# Patient Record
Sex: MALE | Race: WHITE | Employment: OTHER | URBAN - METROPOLITAN AREA
[De-identification: names, ages, dates, MRNs, and addresses within clinical notes are randomized per-mention and may not be internally consistent; named-entity substitution may affect disease eponyms.]

---

## 2023-02-02 ENCOUNTER — TELEPHONE (OUTPATIENT)
Dept: CARDIOLOGY | Age: 68
End: 2023-02-02

## 2023-02-06 ENCOUNTER — HOSPITAL ENCOUNTER (OUTPATIENT)
Dept: CARDIOLOGY | Age: 68
Discharge: HOME | End: 2023-02-06
Payer: MEDICARE

## 2023-02-06 VITALS
WEIGHT: 260 LBS | HEART RATE: 65 BPM | RESPIRATION RATE: 16 BRPM | HEIGHT: 72 IN | SYSTOLIC BLOOD PRESSURE: 142 MMHG | DIASTOLIC BLOOD PRESSURE: 76 MMHG | BODY MASS INDEX: 35.21 KG/M2

## 2023-02-06 DIAGNOSIS — R94.31 ABNORMAL EKG: Primary | ICD-10-CM

## 2023-02-06 PROCEDURE — 78452 HT MUSCLE IMAGE SPECT MULT: CPT

## 2023-02-06 PROCEDURE — 2580000003 HC RX 258: Performed by: INTERNAL MEDICINE

## 2023-02-06 PROCEDURE — 6360000002 HC RX W HCPCS: Performed by: NURSE PRACTITIONER

## 2023-02-06 PROCEDURE — A9500 TC99M SESTAMIBI: HCPCS | Performed by: INTERNAL MEDICINE

## 2023-02-06 PROCEDURE — 93017 CV STRESS TEST TRACING ONLY: CPT

## 2023-02-06 PROCEDURE — 3430000000 HC RX DIAGNOSTIC RADIOPHARMACEUTICAL: Performed by: INTERNAL MEDICINE

## 2023-02-06 RX ORDER — TECHNETIUM TC-99M SESTAMIBI 1 MG/10ML
10.2 INJECTION INTRAVENOUS
Status: COMPLETED | OUTPATIENT
Start: 2023-02-06 | End: 2023-02-06

## 2023-02-06 RX ORDER — TECHNETIUM TC-99M SESTAMIBI 1 MG/10ML
30.8 INJECTION INTRAVENOUS
Status: COMPLETED | OUTPATIENT
Start: 2023-02-06 | End: 2023-02-06

## 2023-02-06 RX ORDER — SODIUM CHLORIDE 0.9 % (FLUSH) 0.9 %
10 SYRINGE (ML) INJECTION PRN
Status: DISCONTINUED | OUTPATIENT
Start: 2023-02-06 | End: 2023-02-07 | Stop reason: HOSPADM

## 2023-02-06 RX ADMIN — Medication 10.2 MILLICURIE: at 09:07

## 2023-02-06 RX ADMIN — Medication 30.8 MILLICURIE: at 10:17

## 2023-02-06 RX ADMIN — SODIUM CHLORIDE, PRESERVATIVE FREE 10 ML: 5 INJECTION INTRAVENOUS at 09:07

## 2023-02-06 RX ADMIN — SODIUM CHLORIDE, PRESERVATIVE FREE 10 ML: 5 INJECTION INTRAVENOUS at 10:18

## 2023-02-06 RX ADMIN — SODIUM CHLORIDE, PRESERVATIVE FREE 10 ML: 5 INJECTION INTRAVENOUS at 10:17

## 2023-02-06 RX ADMIN — REGADENOSON 0.4 MG: 0.08 INJECTION, SOLUTION INTRAVENOUS at 10:17

## 2023-02-06 NOTE — PROCEDURES
[id number] [facility], [facility] - [address], [address] [place] [phone]                Pharmacologic Stress Nuclear Gated SPECT Study    Name: [facility] Account Number: [de-identified]    :  1955          Sex: male         Date of Study:  2023    Height: 6' (182.9 cm)         Weight: 260 lb (117.9 kg)     Ordering Provider: EMMY Frost          PCP: CLARE Frost CNP      Cardiologist: none             Interpreting Physician: Liss Oquendo MD  _________________________________________________________________________________    Indication:   Detecting the presence and location of coronary artery disease    Clinical History:   Patient has no known history of coronary artery disease. Procedure:   Pharmacologic stress testing was performed with regadenoson 0.4 mg for 15 seconds. Additionally, low-level exercise was performed along with the infusion. The heart rate was 65 at baseline and jadiel to 109 beats during the infusion. This corresponds to 72% of maximum predicted heart rate. The blood pressure at baseline was 142/76 and blood pressure at the end of infusion was 146/80. Blood pressure response was normal during the stress procedure. The patient experienced mild shortness of breathe and dizziness during the infusion. ECG during the infusion did not change. IMAGING: Myocardial perfusion imaging was performed at rest 30-35 minutes following the intravenous injection of 10.2 mCi of (Tc-Sestamibi) followed by 10 ml of Normal Saline. As per infusion protocol, the patient was injected intravenously with 30.8 mCi of (Tc-Sestamibi) followed by 10 ml of Normal Saline. Gated post-stress tomographic imaging was performed 20-25 minutes after stress. FINDINGS: The overall quality of the study was good.      Left ventricular cavity size was noted to be normal.    Rotational analog analysis Discussed monovision. This helps lessen the dependence on glasses, but is a compromise and glasses are often still needed for some activities including driving and binocular vision. demonstrated no patient motion or abnormal extracardiac radioactivity. A mild defect was present in the basal inferolateral and basal inferior wall(s) that was  small sized by quantification. The resting images show no change. Gated SPECT left ventricular ejection fraction was calculated to be 51%, with hypokinesis of the inferior wall. Impression:    ECG during the infusion did not change. The myocardial perfusion imaging was abnormal.  Although the perfusion defect conceivably could be artifactual, it is coupled with a wall motion abnormality in same territory of ventricle  Overall left ventricular systolic function was abnormal with regional wall motion abnormalities. Intermediate risk general pharmacologic nuclear stress test.    Thank you for sending your patient to this [employer].      Electronically signed by Abraham Rivera MD on 2/6/23 at 12:24 PM EST

## 2023-02-06 NOTE — DISCHARGE INSTRUCTIONS
[address] [address]      Instructions to Patients    The following are the instructions for patients who have had a procedure in our office today. Patient name: Zachery Leventhal Sr.    Radionuclide Activity: 40mCi of 99mTc-Sestamibi    Date Administered: 2/6/2023    Expires: 48 hours after scheduled appointment time      Patient may resume normal activity unless otherwise instructed. Patient may resume medications as normal.  If the need should arise, patient may call [phone] between the hours of 8:00am-5:00pm.  After hours there is at least one physician on-call at all times for those patients needing assistance. Patients may call [phone] and the answering service will direct the patient to one of our physicians for assistance. After the patient's test if they are going to be leaving from an airport in the near future they should take this letter with them to verify the test and radionuclide used for their test.      This letter verifies that the above named bearer received an injection of a radionuclide for medical purpose/usage only.         Electronically signed by Mauricio Camarillo on 2/6/2023 at 10:17 AM

## 2023-02-07 ENCOUNTER — TELEPHONE (OUTPATIENT)
Dept: ADMINISTRATIVE | Age: 68
End: 2023-02-07

## 2023-02-07 NOTE — TELEPHONE ENCOUNTER
URGENT NP scheduled from the [facility]. Patient Appointment Form:      PCP: Dr. Jason Walton  Referring: Dr. Jason Walton    Has the Patient:    Seen a Cardiologist? no    Had a heart catheterization? no    Had heart surgery? no    Had a stress test or nuclear stress test? Yes 2/6/23 [facility] [place]    Had an echocardiogram? no    Had a vascular ultrasound? no    Had a 24/48 heart monitor or extended cardiac event monitor? no    Had recent blood work in the last 6 months? Had a pacemaker/ICD/ILR implant? no    Seen an Electrophysiologist? no        Will send records via: Recent + stress only - recs - PCP recs in Media      Date & time of appointment:  2/10/23 @ 11:45 with Dr Aaron Garcia - if pt is willing to wait for [facility] - they will callback and r/s after speaking with Jason Walton?

## 2023-02-08 PROBLEM — R94.39 ABNORMAL NUCLEAR STRESS TEST: Status: ACTIVE | Noted: 2023-02-08

## 2023-02-10 ENCOUNTER — OFFICE VISIT (OUTPATIENT)
Dept: CARDIOLOGY CLINIC | Age: 68
End: 2023-02-10
Payer: MEDICARE

## 2023-02-10 VITALS
SYSTOLIC BLOOD PRESSURE: 175 MMHG | DIASTOLIC BLOOD PRESSURE: 81 MMHG | RESPIRATION RATE: 16 BRPM | HEIGHT: 72 IN | BODY MASS INDEX: 35.54 KG/M2 | HEART RATE: 64 BPM | WEIGHT: 262.4 LBS

## 2023-02-10 DIAGNOSIS — F17.210 CIGARETTE NICOTINE DEPENDENCE WITHOUT COMPLICATION: ICD-10-CM

## 2023-02-10 DIAGNOSIS — R94.39 ABNORMAL NUCLEAR STRESS TEST: Primary | ICD-10-CM

## 2023-02-10 DIAGNOSIS — E66.09 CLASS 2 OBESITY DUE TO EXCESS CALORIES WITHOUT SERIOUS COMORBIDITY WITH BODY MASS INDEX (BMI) OF 35.0 TO 35.9 IN ADULT: ICD-10-CM

## 2023-02-10 PROBLEM — E66.9 OBESITY: Status: ACTIVE | Noted: 2023-02-10

## 2023-02-10 PROCEDURE — G8427 DOCREV CUR MEDS BY ELIG CLIN: HCPCS | Performed by: INTERNAL MEDICINE

## 2023-02-10 PROCEDURE — G8417 CALC BMI ABV UP PARAM F/U: HCPCS | Performed by: INTERNAL MEDICINE

## 2023-02-10 PROCEDURE — 99205 OFFICE O/P NEW HI 60 MIN: CPT | Performed by: INTERNAL MEDICINE

## 2023-02-10 PROCEDURE — 1123F ACP DISCUSS/DSCN MKR DOCD: CPT | Performed by: INTERNAL MEDICINE

## 2023-02-10 PROCEDURE — 4004F PT TOBACCO SCREEN RCVD TLK: CPT | Performed by: INTERNAL MEDICINE

## 2023-02-10 PROCEDURE — G8484 FLU IMMUNIZE NO ADMIN: HCPCS | Performed by: INTERNAL MEDICINE

## 2023-02-10 PROCEDURE — 3017F COLORECTAL CA SCREEN DOC REV: CPT | Performed by: INTERNAL MEDICINE

## 2023-02-10 PROCEDURE — 93000 ELECTROCARDIOGRAM COMPLETE: CPT | Performed by: INTERNAL MEDICINE

## 2023-02-10 RX ORDER — ERGOCALCIFEROL 1.25 MG/1
CAPSULE ORAL
COMMUNITY
Start: 2023-01-18

## 2023-02-10 NOTE — PROGRESS NOTES
Chief Complaint   Patient presents with    Abnormal Test Results       Patient Active Problem List    Diagnosis Date Noted    Obesity 02/10/2023    Cigarette nicotine dependence 02/10/2023    Abnormal nuclear stress test 02/08/2023     Overview Note:     Rupert Doan 2/6/2023: The myocardial perfusion imaging was abnormal.  Although the perfusion defect conceivably could be artifactual, it is coupled with a wall motion abnormality in same territory of ventricle  Overall left ventricular systolic function was abnormal with regional wall motion abnormalities. Intermediate risk general pharmacologic nuclear stress test.            Current Outpatient Medications   Medication Sig Dispense Refill    vitamin D (ERGOCALCIFEROL) 1.25 MG (04371 UT) CAPS capsule TAKE 1 CAPSULE BY MOUTH ONCE A WEEK      metaxalone (SKELAXIN) 800 MG tablet Take 800 mg by mouth 3 times daily. clonazePAM (KLONOPIN) 1 MG tablet Take 1 mg by mouth 3 times daily as needed. No current facility-administered medications for this visit. No Known Allergies    Vitals:    02/10/23 1132   BP: (!) 175/81   Pulse: 64   Resp: 16   Weight: 262 lb 6.4 oz (119 kg)   Height: 6' (1.829 m)                 SUBJECTIVE: Margaux Bennett Sr. presents to the office today for consult - Roberto Carlos Araiza. Has a hx of unknown psych diagnosis, appears that he recently saw his PCP for first time in a long while as he needed approval to maintain certain psych meds? ?  EKG performed, leading to pharm stress, which I performed   He complains of  NO cardiac symptoms nor decline in functional capacity and his wife agrees. He does heavy chores around the house  and denies   dyspnea, exertional chest pressure/discomfort, orthopnea, palpitations, and syncope  Active smoker. Lipid status unknown.   Home BP readings are reportedly normal .          Physical Exam   BP (!) 175/81   Pulse 64   Resp 16   Ht 6' (1.829 m)   Wt 262 lb 6.4 oz (119 kg)   BMI 35.59 kg/m² Constitutional: Oriented to person, place, and time. Obese No distress. Head: Normocephalic and atraumatic. Neck: No JVD present. Carotid bruit is not present. Cardiovascular: Normal rate, regular rhythm, normal heart sounds and intact distal pulses. No gallop and no friction rub. No murmur heard. Pulmonary/Chest: Effort normal and breath sounds normal.   Abdominal: Soft. Bowel sounds are normal. No distension and no mass. Musculoskeletal: No edema   Neurological: Alert and oriented to person, place, and time. Skin: Skin is warm and dry. No rash noted. Psychiatric: Normal mood and affect. Behavior is normal.     EKG:  normal sinus rhythm, SR',  remote septal MI.    ASSESSMENT AND PLAN:  Patient Active Problem List   Diagnosis    Abnormal nuclear stress test    Obesity    Cigarette nicotine dependence     Patient with NO cardiac symptoms at good functional capacity for age  Abnormal ekg, but none in chart to compare it with  His nuclear stress test was, not unexpectedly, problematic to interpret given the lack of exercise component, and his obesity lending to false positive tendencies. The current study was read identically to that of 1/4/2012 - and both are likely artifactual in nature. He does not need a cath  Having said, that, both he and his wife were informed that he welll may have CAD and advised to immediately stop smoking. Statin per PCP per NCEP guidelines ( I do not have values).   Continue home BP readings  He was educated as to true symptoms of angina        Rebeca Molina M.D  [id number] [facility]